# Patient Record
Sex: MALE | URBAN - METROPOLITAN AREA
[De-identification: names, ages, dates, MRNs, and addresses within clinical notes are randomized per-mention and may not be internally consistent; named-entity substitution may affect disease eponyms.]

---

## 2021-12-22 ENCOUNTER — OFFICE VISIT (OUTPATIENT)
Dept: URBAN - METROPOLITAN AREA CLINIC 42 | Facility: CLINIC | Age: 66
End: 2021-12-22
Payer: COMMERCIAL

## 2021-12-22 PROCEDURE — 99212 OFFICE O/P EST SF 10 MIN: CPT | Performed by: OPHTHALMOLOGY

## 2021-12-22 RX ORDER — ACYCLOVIR 400 MG/1
400 MG TABLET ORAL
Qty: 120 | Refills: 1 | Status: ACTIVE
Start: 2021-12-22

## 2021-12-22 ASSESSMENT — INTRAOCULAR PRESSURE
OS: 20
OD: 24

## 2021-12-22 NOTE — IMPRESSION/PLAN
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  Plan: Acyclovir 400 mg Po Qid for one month follow up in one week instill at;s as needed for comfort

## 2021-12-29 ENCOUNTER — OFFICE VISIT (OUTPATIENT)
Dept: URBAN - METROPOLITAN AREA CLINIC 42 | Facility: CLINIC | Age: 66
End: 2021-12-29
Payer: COMMERCIAL

## 2021-12-29 DIAGNOSIS — B00.51 HERPESVIRAL IRIDOCYCLITIS: Primary | ICD-10-CM

## 2021-12-29 PROCEDURE — 99213 OFFICE O/P EST LOW 20 MIN: CPT | Performed by: OPHTHALMOLOGY

## 2021-12-29 RX ORDER — PREDNISOLONE ACETATE 10 MG/ML
1 % SUSPENSION/ DROPS OPHTHALMIC
Qty: 10 | Refills: 1 | Status: ACTIVE
Start: 2021-12-29

## 2021-12-29 ASSESSMENT — INTRAOCULAR PRESSURE
OD: 21
OS: 19

## 2021-12-29 NOTE — IMPRESSION/PLAN
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 Plan: Continue Acyclovir and start Prednisolone QID OD. 59.9

## 2022-01-12 ENCOUNTER — OFFICE VISIT (OUTPATIENT)
Dept: URBAN - METROPOLITAN AREA CLINIC 42 | Facility: CLINIC | Age: 67
End: 2022-01-12
Payer: COMMERCIAL

## 2022-01-12 PROCEDURE — 99212 OFFICE O/P EST SF 10 MIN: CPT | Performed by: OPHTHALMOLOGY

## 2022-01-12 ASSESSMENT — INTRAOCULAR PRESSURE: OD: 20

## 2022-01-12 NOTE — IMPRESSION/PLAN
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  Plan: Patient to reduce the acyclovir to BID and continue pred QID OD- return in 1 month for follow up

## 2022-02-16 ENCOUNTER — OFFICE VISIT (OUTPATIENT)
Dept: URBAN - METROPOLITAN AREA CLINIC 42 | Facility: CLINIC | Age: 67
End: 2022-02-16
Payer: COMMERCIAL

## 2022-02-16 PROCEDURE — 99213 OFFICE O/P EST LOW 20 MIN: CPT | Performed by: OPHTHALMOLOGY

## 2022-02-16 ASSESSMENT — INTRAOCULAR PRESSURE
OS: 18
OD: 19

## 2022-02-16 NOTE — IMPRESSION/PLAN
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 Plan: Continue Acyclovir po BID and Prednisolone QID OD.

## 2022-04-27 ENCOUNTER — OFFICE VISIT (OUTPATIENT)
Dept: URBAN - METROPOLITAN AREA CLINIC 42 | Facility: CLINIC | Age: 67
End: 2022-04-27
Payer: COMMERCIAL

## 2022-04-27 DIAGNOSIS — B00.51 HERPESVIRAL IRIDOCYCLITIS: Primary | ICD-10-CM

## 2022-04-27 PROCEDURE — 99213 OFFICE O/P EST LOW 20 MIN: CPT | Performed by: OPHTHALMOLOGY

## 2022-04-27 RX ORDER — PREDNISOLONE ACETATE 10 MG/ML
1 % SUSPENSION/ DROPS OPHTHALMIC
Qty: 10 | Refills: 1 | Status: INACTIVE
Start: 2022-04-27 | End: 2022-04-27

## 2022-04-27 RX ORDER — PREDNISOLONE ACETATE 10 MG/ML
1 % SUSPENSION/ DROPS OPHTHALMIC
Qty: 10 | Refills: 1 | Status: ACTIVE
Start: 2022-04-27

## 2022-04-27 ASSESSMENT — INTRAOCULAR PRESSURE: OD: 17

## 2022-06-29 ENCOUNTER — OFFICE VISIT (OUTPATIENT)
Dept: URBAN - METROPOLITAN AREA CLINIC 42 | Facility: CLINIC | Age: 67
End: 2022-06-29
Payer: COMMERCIAL

## 2022-06-29 DIAGNOSIS — H02.831 DERMATOCHALASIS OF RIGHT UPPER EYELID: ICD-10-CM

## 2022-06-29 DIAGNOSIS — H02.834 DERMATOCHALASIS OF LEFT UPPER EYELID: ICD-10-CM

## 2022-06-29 DIAGNOSIS — B00.51 HERPESVIRAL IRIDOCYCLITIS: Primary | ICD-10-CM

## 2022-06-29 PROCEDURE — 99213 OFFICE O/P EST LOW 20 MIN: CPT | Performed by: OPHTHALMOLOGY

## 2022-06-29 ASSESSMENT — INTRAOCULAR PRESSURE: OD: 19

## 2022-06-29 NOTE — IMPRESSION/PLAN
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  Plan:
Impression: Dermatochalasis of right upper eyelid: H02.831. Plan: Return for [facility] - BATH testing.
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 Plan: resolved. Continue Pred as preventative.
no

## 2022-07-06 ENCOUNTER — TESTING ONLY (OUTPATIENT)
Dept: URBAN - METROPOLITAN AREA CLINIC 8 | Facility: CLINIC | Age: 67
End: 2022-07-06
Payer: COMMERCIAL

## 2022-07-06 DIAGNOSIS — H02.831 DERMATOCHALASIS OF RIGHT UPPER EYELID: Primary | ICD-10-CM

## 2022-07-06 PROCEDURE — 92285 EXTERNAL OCULAR PHOTOGRAPHY: CPT | Performed by: OPHTHALMOLOGY
